# Patient Record
Sex: FEMALE | Race: WHITE | NOT HISPANIC OR LATINO | Employment: FULL TIME | URBAN - NONMETROPOLITAN AREA
[De-identification: names, ages, dates, MRNs, and addresses within clinical notes are randomized per-mention and may not be internally consistent; named-entity substitution may affect disease eponyms.]

---

## 2017-06-27 ENCOUNTER — APPOINTMENT (OUTPATIENT)
Dept: OCCUPATIONAL MEDICINE | Facility: OTHER | Age: 32
End: 2017-06-27

## 2017-06-27 PROCEDURE — 86580 TB INTRADERMAL TEST: CPT

## 2018-06-05 ENCOUNTER — HOSPITAL ENCOUNTER (EMERGENCY)
Facility: HOSPITAL | Age: 33
Discharge: HOME | End: 2018-06-05
Attending: PHYSICIAN ASSISTANT | Admitting: PHYSICIAN ASSISTANT
Payer: COMMERCIAL

## 2018-06-05 ENCOUNTER — APPOINTMENT (OUTPATIENT)
Dept: GENERAL RADIOLOGY | Facility: HOSPITAL | Age: 33
End: 2018-06-05
Attending: PHYSICIAN ASSISTANT
Payer: COMMERCIAL

## 2018-06-05 VITALS
SYSTOLIC BLOOD PRESSURE: 121 MMHG | OXYGEN SATURATION: 97 % | TEMPERATURE: 97.8 F | DIASTOLIC BLOOD PRESSURE: 62 MMHG | HEART RATE: 86 BPM | RESPIRATION RATE: 16 BRPM

## 2018-06-05 DIAGNOSIS — R19.7 DIARRHEA, UNSPECIFIED TYPE: ICD-10-CM

## 2018-06-05 DIAGNOSIS — R50.81 FEVER IN OTHER DISEASES: ICD-10-CM

## 2018-06-05 LAB
ALBUMIN SERPL-MCNC: 3.5 G/DL (ref 3.4–5)
ALBUMIN UR-MCNC: 30 MG/DL
ALP SERPL-CCNC: 55 U/L (ref 40–150)
ALT SERPL W P-5'-P-CCNC: 12 U/L (ref 0–50)
AMYLASE SERPL-CCNC: 47 U/L (ref 30–110)
ANION GAP SERPL CALCULATED.3IONS-SCNC: 8 MMOL/L (ref 3–14)
APPEARANCE UR: CLEAR
AST SERPL W P-5'-P-CCNC: 13 U/L (ref 0–45)
BACTERIA #/AREA URNS HPF: ABNORMAL /HPF
BASOPHILS # BLD AUTO: 0 10E9/L (ref 0–0.2)
BASOPHILS NFR BLD AUTO: 0.5 %
BILIRUB SERPL-MCNC: 0.3 MG/DL (ref 0.2–1.3)
BILIRUB UR QL STRIP: ABNORMAL
BUN SERPL-MCNC: 13 MG/DL (ref 7–30)
C DIFF TOX B STL QL: NEGATIVE
CALCIUM SERPL-MCNC: 8.9 MG/DL (ref 8.5–10.1)
CHLORIDE SERPL-SCNC: 104 MMOL/L (ref 94–109)
CO2 SERPL-SCNC: 25 MMOL/L (ref 20–32)
COLOR UR AUTO: YELLOW
CREAT SERPL-MCNC: 0.78 MG/DL (ref 0.52–1.04)
CRP SERPL-MCNC: 48.2 MG/L (ref 0–8)
DIFFERENTIAL METHOD BLD: NORMAL
EOSINOPHIL # BLD AUTO: 0 10E9/L (ref 0–0.7)
EOSINOPHIL NFR BLD AUTO: 0.5 %
ERYTHROCYTE [DISTWIDTH] IN BLOOD BY AUTOMATED COUNT: 12.3 % (ref 10–15)
G LAMBLIA AG STL QL IA: NORMAL
G LAMBLIA AG STL QL IA: NORMAL
G LAMBLIA+CRYPTOSP AG STL QL IA: NORMAL
GFR SERPL CREATININE-BSD FRML MDRD: 85 ML/MIN/1.7M2
GLUCOSE SERPL-MCNC: 85 MG/DL (ref 70–99)
GLUCOSE UR STRIP-MCNC: NEGATIVE MG/DL
HCG UR QL: NEGATIVE
HCT VFR BLD AUTO: 39.9 % (ref 35–47)
HGB BLD-MCNC: 14.1 G/DL (ref 11.7–15.7)
HGB UR QL STRIP: ABNORMAL
IMM GRANULOCYTES # BLD: 0 10E9/L (ref 0–0.4)
IMM GRANULOCYTES NFR BLD: 0.2 %
KETONES UR STRIP-MCNC: >150 MG/DL
LACTOFERRIN STL QL IA: POSITIVE
LEUKOCYTE ESTERASE UR QL STRIP: NEGATIVE
LIPASE SERPL-CCNC: 169 U/L (ref 73–393)
LYMPHOCYTES # BLD AUTO: 2 10E9/L (ref 0.8–5.3)
LYMPHOCYTES NFR BLD AUTO: 24.5 %
MCH RBC QN AUTO: 29.6 PG (ref 26.5–33)
MCHC RBC AUTO-ENTMCNC: 35.3 G/DL (ref 31.5–36.5)
MCV RBC AUTO: 84 FL (ref 78–100)
MONOCYTES # BLD AUTO: 0.7 10E9/L (ref 0–1.3)
MONOCYTES NFR BLD AUTO: 8.7 %
MUCOUS THREADS #/AREA URNS LPF: PRESENT /LPF
NEUTROPHILS # BLD AUTO: 5.3 10E9/L (ref 1.6–8.3)
NEUTROPHILS NFR BLD AUTO: 65.6 %
NITRATE UR QL: NEGATIVE
NRBC # BLD AUTO: 0 10*3/UL
NRBC BLD AUTO-RTO: 0 /100
PH UR STRIP: 6.5 PH (ref 4.7–8)
PLATELET # BLD AUTO: 352 10E9/L (ref 150–450)
POTASSIUM SERPL-SCNC: 3.9 MMOL/L (ref 3.4–5.3)
PROT SERPL-MCNC: 7.5 G/DL (ref 6.8–8.8)
RBC # BLD AUTO: 4.77 10E12/L (ref 3.8–5.2)
RBC #/AREA URNS AUTO: 8 /HPF (ref 0–2)
SODIUM SERPL-SCNC: 137 MMOL/L (ref 133–144)
SOURCE: ABNORMAL
SP GR UR STRIP: 1.02 (ref 1–1.03)
SPECIMEN SOURCE: NORMAL
SQUAMOUS #/AREA URNS AUTO: 2 /HPF (ref 0–1)
UROBILINOGEN UR STRIP-MCNC: 2 MG/DL (ref 0–2)
WBC # BLD AUTO: 8.1 10E9/L (ref 4–11)
WBC #/AREA URNS AUTO: 5 /HPF (ref 0–5)

## 2018-06-05 PROCEDURE — 83690 ASSAY OF LIPASE: CPT | Performed by: PHYSICIAN ASSISTANT

## 2018-06-05 PROCEDURE — 87046 STOOL CULTR AEROBIC BACT EA: CPT | Performed by: PHYSICIAN ASSISTANT

## 2018-06-05 PROCEDURE — 87015 SPECIMEN INFECT AGNT CONCNTJ: CPT | Performed by: PHYSICIAN ASSISTANT

## 2018-06-05 PROCEDURE — 81025 URINE PREGNANCY TEST: CPT | Performed by: PHYSICIAN ASSISTANT

## 2018-06-05 PROCEDURE — 80053 COMPREHEN METABOLIC PANEL: CPT | Performed by: PHYSICIAN ASSISTANT

## 2018-06-05 PROCEDURE — 36415 COLL VENOUS BLD VENIPUNCTURE: CPT | Performed by: PHYSICIAN ASSISTANT

## 2018-06-05 PROCEDURE — 87899 AGENT NOS ASSAY W/OPTIC: CPT | Performed by: PHYSICIAN ASSISTANT

## 2018-06-05 PROCEDURE — 99214 OFFICE O/P EST MOD 30 MIN: CPT | Performed by: PHYSICIAN ASSISTANT

## 2018-06-05 PROCEDURE — 86140 C-REACTIVE PROTEIN: CPT | Performed by: PHYSICIAN ASSISTANT

## 2018-06-05 PROCEDURE — 83630 LACTOFERRIN FECAL (QUAL): CPT | Performed by: PHYSICIAN ASSISTANT

## 2018-06-05 PROCEDURE — 85025 COMPLETE CBC W/AUTO DIFF WBC: CPT | Performed by: PHYSICIAN ASSISTANT

## 2018-06-05 PROCEDURE — 87045 FECES CULTURE AEROBIC BACT: CPT | Performed by: PHYSICIAN ASSISTANT

## 2018-06-05 PROCEDURE — 81001 URINALYSIS AUTO W/SCOPE: CPT | Performed by: PHYSICIAN ASSISTANT

## 2018-06-05 PROCEDURE — 87329 GIARDIA AG IA: CPT | Performed by: PHYSICIAN ASSISTANT

## 2018-06-05 PROCEDURE — 87493 C DIFF AMPLIFIED PROBE: CPT | Performed by: PHYSICIAN ASSISTANT

## 2018-06-05 PROCEDURE — 87328 CRYPTOSPORIDIUM AG IA: CPT | Performed by: PHYSICIAN ASSISTANT

## 2018-06-05 PROCEDURE — 82150 ASSAY OF AMYLASE: CPT | Performed by: PHYSICIAN ASSISTANT

## 2018-06-05 PROCEDURE — 74019 RADEX ABDOMEN 2 VIEWS: CPT | Mod: TC

## 2018-06-05 PROCEDURE — G0463 HOSPITAL OUTPT CLINIC VISIT: HCPCS

## 2018-06-05 ASSESSMENT — ENCOUNTER SYMPTOMS
CARDIOVASCULAR NEGATIVE: 1
BACK PAIN: 0
VOMITING: 0
FATIGUE: 1
SHORTNESS OF BREATH: 0
CHEST TIGHTNESS: 0
ABDOMINAL PAIN: 0
APPETITE CHANGE: 1
FEVER: 1
NAUSEA: 0
PSYCHIATRIC NEGATIVE: 1
DIARRHEA: 1
NEUROLOGICAL NEGATIVE: 1

## 2018-06-05 NOTE — ED AVS SNAPSHOT
[facility]    [address] [address]    Phone:  [phone]                                       Najma Quispe   MRN: [id number]    Department:  [facility]   Date of Visit:  6/5/2018           Patient Information     Date Of Birth          1985        Your diagnoses for this visit were:     Diarrhea, unspecified type     Fever in other diseases        You were seen by Josee Bansal PA.      Follow-up Information     Follow up with [facility].    Specialty:  EMERGENCY MEDICINE    Why:  If symptoms worsen or if fever/further concerns develop    Contact information:    [address] [place] [address] [phone]    Additional information:    From [place]: Take [address]. Turn left at [address]. Turn left at the first stoplight on [address]. At the first stop sign, take a right onto [address]. Take a left into the parking lot and continue through until you reach the North enterance of the building.       From [place]: Take [address]. Take the [address] ramp towards [place]. Turn left onto [address]. Take a slight right onto [address]. Turn left at the first stoplight on [address]. At the first stop sign, take a right onto [address]. Take a left into the parking lot and continue through until you reach the North enterance of the building.       From [place]: Take [address]. Take a right at [facility] [address]. At the first stop sign, take a right onto [address]. Take a left into the parking lot and continue through until you reach the North enterance of the building.       Discharge References/Attachments     DIARRHEA, TREATING (ENGLISH)    DIARRHEA, UNKNOWN CAUSE (ENGLISH)    GIARDIA ANTIGEN (STOOL) (ENGLISH)    C. DIFF, WHAT IS  (ENGLISH)      Your next 10 appointments already scheduled     Jun 20, 2018  8:30 AM CDT   (Arrive by 8:15 AM)    Office Visit with Maria Guadalupe Ramirez MD   [facility] ([facility] )    [place] [address]   [phone]           Bring a current list of meds and any records pertaining to this visit. For Physicals, please bring immunization records and any forms needing to be filled out. Please arrive 10 minutes early to complete paperwork.                 Review of your medicines      Notice     You have not been prescribed any medications.            Procedures and tests performed during your visit     Procedure/Test Number of Times Performed    Amylase 1    CBC with platelets differential 1    CRP inflammation 1    Clostridium difficile toxin B PCR 1    Comprehensive metabolic panel 1    Fecal Lactoferrin 1    Giardia antigen 1    HCG qualitative urine 1    Lipase 1    Ova and Parasite Screen 2    Stool culture SSCE 1    UA reflex to Microscopic and Culture 1    XR Abdomen 2 Views 1      Orders Needing Specimen Collection     Ordered          06/05/18 1009  Fecal Lactoferrin - STAT, Prio: STAT, Final result     Scheduled Task Status   06/05/18 1009 10-20 Media CC Reminder: Open   Order Class:  PCU Collect                06/05/18 1009  Giardia antigen - STAT, Prio: STAT, Final result     Scheduled Task Status   06/05/18 1009 10-20 Media CC Reminder: Open   Order Class:  PCU Collect                06/05/18 1046  Ova and Parasite Screen - STAT, Prio: STAT, In process     Scheduled Task Status   06/05/18 1047 10-20 Media CC Reminder: Open   Order Class:  PCU Collect                06/05/18 1110  Ova and Parasite Screen - STAT, Prio: STAT, Final result     Scheduled Task Status   06/05/18 1111 10-20 Media CC Reminder: Open   Order Class:  PCU Collect                  Pending Results     Date and Time Order Name Status Description    6/5/2018 1046 Ova and Parasite Screen In process     6/5/2018 1009 Clostridium difficile toxin B PCR In process     6/5/2018 1009 Stool culture SSCE In process   "           Pending Culture Results     Date and Time Order Name Status Description    2018 1046 Ova and Parasite Screen In process     2018 1009 Clostridium difficile toxin B PCR In process     2018 1009 Stool culture SSCE In process             Thank you for choosing [facility]       Thank you for choosing [facility] for your care. Our goal is always to provide you with excellent care. Hearing back from our patients is one way we can continue to improve our services. Please take a few minutes to complete the written survey that you may receive in the mail after you visit with us. Thank you!        [employer] Information     [employer] lets you send messages to your doctor, view your test results, renew your prescriptions, schedule appointments and more. To sign up, go to [email or web address] . Click on \"Log in\" on the left side of the screen, which will take you to the Welcome page. Then click on \"Sign up Now\" on the right side of the page.     You will be asked to enter the access code listed below, as well as some personal information. Please follow the directions to create your username and password.     Your access code is: [email or web address]  Expires: 9/3/2018 11:55 AM     Your access code will  in 90 days. If you need help or a new code, please call your [facility] clinic or [phone].        Care EveryWhere ID     This is your Care EveryWhere ID. This could be used by other organizations to access your [facility] medical records  [id number]        Equal Access to Services     RYAN PISANO AH: Hadii mindy Lomas, waaxda lurichyadaha, qaybta kaalmada david, richelle nugent. So [facility] [phone].    ATENCIÓN: Si habla español, tiene a hurtado disposición servicios gratuitos de asistencia lingüística. Ethel al [phone].    We comply with applicable federal civil rights laws and [place] laws. We do not discriminate on the basis of race, color, national origin, age, " disability, sex, sexual orientation, or gender identity.            After Visit Summary       This is your record. Keep this with you and show to your community pharmacist(s) and doctor(s) at your next visit.

## 2018-06-05 NOTE — ED AVS SNAPSHOT
[facility]    [address] [address]    Phone:  [phone]                                       Najma Quispe   MRN: [id number]    Department:  [facility]   Date of Visit:  6/5/2018           After Visit Summary Signature Page     I have received my discharge instructions, and my questions have been answered. I have discussed any challenges I see with this plan with the nurse or doctor.    ..........................................................................................................................................  Patient/Patient Representative Signature      ..........................................................................................................................................  Patient Representative Print Name and Relationship to Patient    ..................................................               ................................................  Date                                            Time    ..........................................................................................................................................  Reviewed by Signature/Title    ...................................................              ..............................................  Date                                                            Time

## 2018-06-05 NOTE — ED TRIAGE NOTES
Pt presents today with c/o pt was sick all weekend and having diarrhea. Pt is feeling better but is still having some diarrhea and abdominal cramping.

## 2018-06-05 NOTE — ED AVS SNAPSHOT
[facility]    [address] [address]    Phone:  [phone]                                       Najma Quispe   MRN: [id number]    Department[facility]  [facility]   Date of Visit:  6/5/2018           Patient Information     Date Of Birth          1985        Your diagnoses for this visit were:     Diarrhea, unspecified type     Fever in other diseases        You were seen by Josee Bansal PA.      Follow-up Information     Follow up with [facility].    Specialty:  EMERGENCY MEDICINE    Why:  If symptoms worsen or if fever/further concerns develop    Contact information:    [address] [place] [address] [phone]    Additional information:    From [place] Area: Take [address]. Turn left at [address]. Turn left at the first stoplight on [address]. At the first stop sign, take a right onto [address]. Take a left into the parking lot and continue through until you reach the North enterance of the building.       From [place]: Take [address]. Take the [address] ramp towards [place]. Turn left onto [address]. Take a slight right onto [address]. Turn left at the first stoplight on [address]. At the first stop sign, take a right onto [address]. Take a left into the parking lot and continue through until you reach the North enterance of the building.       From [place]: Take [address]. Take a right at [address]. At the first stop sign, take a right onto [address]. Take a left into the parking lot and continue through until you reach the North enterance of the building.       Discharge References/Attachments     DIARRHEA, TREATING (ENGLISH)    DIARRHEA, UNKNOWN CAUSE (ENGLISH)    GIARDIA ANTIGEN (STOOL) (ENGLISH)    C. DIFF, WHAT IS  (ENGLISH)      Your next 10 appointments already scheduled     Jun 20, 2018  8:30 AM CDT   (Arrive by 8:15 AM)    Office Visit with Maria Guadalupe Ramirez MD   [facility] ([facility] )    [place] [address]   [phone]           Bring a current list of meds and any records pertaining to this visit. For Physicals, please bring immunization records and any forms needing to be filled out. Please arrive 10 minutes early to complete paperwork.              [facility] Scheduling Hotline     To schedule an appointment at [facility], please call [phone]. If you don't have a family doctor or clinic, we will help you find one. [facility] are conveniently located to serve the needs of you and your family.           Review of your medicines      Notice     You have not been prescribed any medications.            Procedures and tests performed during your visit     Procedure/Test Number of Times Performed    Amylase 1    CBC with platelets differential 1    CRP inflammation 1    Clostridium difficile toxin B PCR 1    Comprehensive metabolic panel 1    Fecal Lactoferrin 1    Giardia antigen 1    HCG qualitative urine 1    Lipase 1    Ova and Parasite Screen 2    Stool culture SSCE 1    UA reflex to Microscopic and Culture 1    XR Abdomen 2 Views 1      Orders Needing Specimen Collection     Ordered          06/05/18 1009  Fecal Lactoferrin - STAT, Prio: STAT, Final result     Scheduled Task Status   06/05/18 1009 [employer] CC Reminder: Open   Order Class:  PCU Collect                06/05/18 1009  Giardia antigen - STAT, Prio: STAT, Final result     Scheduled Task Status   06/05/18 1009 [employer] CC Reminder: Open   Order Class:  PCU Collect                06/05/18 1046  Ova and Parasite Screen - STAT, Prio: STAT, In process     Scheduled Task Status   06/05/18 1047 [employer] CC Reminder: Open   Order Class:  PCU Collect                06/05/18 1110  Ova and Parasite Screen - STAT, Prio: STAT, Final result     Scheduled Task Status   06/05/18 1111 [employer] CC Reminder: Open  "  Order Class:  PCU Collect                  Pending Results     Date and Time Order Name Status Description    2018 1046 Ova and Parasite Screen In process     2018 1009 Clostridium difficile toxin B PCR In process     2018 1009 Stool culture SSCE In process             Pending Culture Results     Date and Time Order Name Status Description    2018 1046 Ova and Parasite Screen In process     2018 1009 Clostridium difficile toxin B PCR In process     2018 1009 Stool culture SSCE In process             Thank you for choosing [facility]       Thank you for choosing [facility] for your care. Our goal is always to provide you with excellent care. Hearing back from our patients is one way we can continue to improve our services. Please take a few minutes to complete the written survey that you may receive in the mail after you visit with us. Thank you!        [employer] Information     [employer] lets you send messages to your doctor, view your test results, renew your prescriptions, schedule appointments and more. To sign up, go to [email or web address] . Click on \"Log in\" on the left side of the screen, which will take you to the Welcome page. Then click on \"Sign up Now\" on the right side of the page.     You will be asked to enter the access code listed below, as well as some personal information. Please follow the directions to create your username and password.     Your access code is: [email or web address]  Expires: 9/3/2018 11:55 AM     Your access code will  in 90 days. If you need help or a new code, please call your [facility] clinic or [phone].        Care EveryWhere ID     This is your Care EveryWhere ID. This could be used by other organizations to access your [facility] medical records  [id number]        Equal Access to Services     RYAN PISANO AH: Diamond Lomas, miryam mars, richelle cruz. So wacarmelina " [phone].    ATENCIÓN: Si habla español, tiene a hurtado disposición servicios gratuitos de asistencia lingüística. Llame al [phone].    We comply with applicable federal civil rights laws and [place] laws. We do not discriminate on the basis of race, color, national origin, age, disability, sex, sexual orientation, or gender identity.            After Visit Summary       This is your record. Keep this with you and show to your community pharmacist(s) and doctor(s) at your next visit.

## 2018-06-05 NOTE — ED PROVIDER NOTES
History     Chief Complaint   Patient presents with     Diarrhea     states she was sick the weekend with diarhea this weekend and fever. states shes better but still having green diarrhea     The history is provided by the patient. No  was used.     Najma Quispe is a 33 year old female who has  3 days of diarrhea and fever. Has decreased energy/appetite/ no n/v. No abdominal pain/cramping. Has not left [place]. No one else in family has same s/s. 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       Family History:    Family History   Problem Relation Age of Onset     GASTROINTESTINAL DISEASE Mother      celiac     Musculoskeletal Disorder Mother      fibromyalgia     GASTROINTESTINAL DISEASE Father      hepatitis c     CANCER Father      lung, smoker     Respiratory Brother      [place]     Family History Negative Brother      Family History Negative Brother      Psychotic Disorder Sister      anorexia     Neurologic Disorder Paternal Grandmother      ?CVA       Social History:  Marital Status:   [2]  Social History   Substance Use Topics     Smoking status: Never Smoker     Smokeless tobacco: Never Used     Alcohol use Yes      Comment: 1 weekend/mo        Medications:      No current outpatient prescriptions on file.      Review of Systems   Constitutional: Positive for appetite change, fatigue and fever.   HENT: Negative.    Respiratory: Negative for chest tightness and shortness of breath.    Cardiovascular: Negative.    Gastrointestinal: Positive for diarrhea. Negative for abdominal pain, nausea and vomiting.   Musculoskeletal: Negative for back pain.   Skin: Negative for  rash.   Neurological: Negative.    Psychiatric/Behavioral: Negative.        Physical Exam   BP: 121/62  Pulse: 86  Temp: 97.8  F (36.6  C)  Resp: 16  SpO2: 97 %      Physical Exam   Constitutional: She is oriented to person, place, and time. She appears well-developed and well-nourished. No distress.   Neck: Normal range of motion. Neck supple.   Cardiovascular: Normal rate, regular rhythm and normal heart sounds.    Pulmonary/Chest: Effort normal and breath sounds normal. No respiratory distress.   Abdominal: Soft. Bowel sounds are normal. She exhibits no distension. There is no tenderness. There is no guarding.   Neurological: She is alert and oriented to person, place, and time.   Skin: Skin is warm and dry. No rash noted. She is not diaphoretic.   Psychiatric: She has a normal mood and affect. Her behavior is normal.   Nursing note and vitals reviewed.      ED Course     ED Course     Procedures          Results for orders placed or performed during the hospital encounter of 06/05/18 (from the past 24 hour(s))   CBC with platelets differential   Result Value Ref Range    WBC 8.1 4.0 - 11.0 10e9/L    RBC Count 4.77 3.8 - 5.2 10e12/L    Hemoglobin 14.1 11.7 - 15.7 g/dL    Hematocrit 39.9 35.0 - 47.0 %    MCV 84 78 - 100 fl    MCH 29.6 26.5 - 33.0 pg    MCHC 35.3 31.5 - 36.5 g/dL    RDW 12.3 10.0 - 15.0 %    Platelet Count 352 150 - 450 10e9/L    Diff Method Automated Method     % Neutrophils 65.6 %    % Lymphocytes 24.5 %    % Monocytes 8.7 %    % Eosinophils 0.5 %    % Basophils 0.5 %    % Immature Granulocytes 0.2 %    Nucleated RBCs 0 0 /100    Absolute Neutrophil 5.3 1.6 - 8.3 10e9/L    Absolute Lymphocytes 2.0 0.8 - 5.3 10e9/L    Absolute Monocytes 0.7 0.0 - 1.3 10e9/L    Absolute Eosinophils 0.0 0.0 - 0.7 10e9/L    Absolute Basophils 0.0 0.0 - 0.2 10e9/L    Abs Immature Granulocytes 0.0 0 - 0.4 10e9/L    Absolute Nucleated RBC 0.0    Comprehensive metabolic panel   Result Value Ref Range    Sodium 137 133 -  144 mmol/L    Potassium 3.9 3.4 - 5.3 mmol/L    Chloride 104 94 - 109 mmol/L    Carbon Dioxide 25 20 - 32 mmol/L    Anion Gap 8 3 - 14 mmol/L    Glucose 85 70 - 99 mg/dL    Urea Nitrogen 13 7 - 30 mg/dL    Creatinine 0.78 0.52 - 1.04 mg/dL    GFR Estimate 85 >60 mL/min/1.7m2    GFR Estimate If Black >90 >60 mL/min/1.7m2    Calcium 8.9 8.5 - 10.1 mg/dL    Bilirubin Total 0.3 0.2 - 1.3 mg/dL    Albumin 3.5 3.4 - 5.0 g/dL    Protein Total 7.5 6.8 - 8.8 g/dL    Alkaline Phosphatase 55 40 - 150 U/L    ALT 12 0 - 50 U/L    AST 13 0 - 45 U/L   Amylase   Result Value Ref Range    Amylase 47 30 - 110 U/L   Lipase   Result Value Ref Range    Lipase 169 73 - 393 U/L   CRP inflammation   Result Value Ref Range    CRP Inflammation 48.2 (H) 0.0 - 8.0 mg/L   UA reflex to Microscopic and Culture   Result Value Ref Range    Color Urine Yellow     Appearance Urine Clear     Glucose Urine Negative NEG^Negative mg/dL    Bilirubin Urine Small (A) NEG^Negative    Ketones Urine >150 (A) NEG^Negative mg/dL    Specific Gravity Urine 1.022 1.003 - 1.035    Blood Urine Small (A) NEG^Negative    pH Urine 6.5 4.7 - 8.0 pH    Protein Albumin Urine 30 (A) NEG^Negative mg/dL    Urobilinogen mg/dL 2.0 0.0 - 2.0 mg/dL    Nitrite Urine Negative NEG^Negative    Leukocyte Esterase Urine Negative NEG^Negative    Source Midstream Urine     RBC Urine 8 (H) 0 - 2 /HPF    WBC Urine 5 0 - 5 /HPF    Bacteria Urine Few (A) NEG^Negative /HPF    Squamous Epithelial /HPF Urine 2 (H) 0 - 1 /HPF    Mucous Urine Present (A) NEG^Negative /LPF   HCG qualitative urine   Result Value Ref Range    HCG Qual Urine Negative NEG^Negative   Clostridium difficile toxin B PCR   Result Value Ref Range    Specimen Description Feces     C Diff Toxin B PCR Negative NEG^Negative   Fecal Lactoferrin   Result Value Ref Range    Fecal Lactoferrin Positive (A) NEG^Negative   Giardia antigen   Result Value Ref Range    Specimen Description Feces     Giardia Antigen Test Canceled, Test  credited     Giardia Antigen Test Incorrectly ordered by PCU/Clinic    Ova and Parasite Screen   Result Value Ref Range    Specimen Description Feces     Ova and Parasite Canceled, Test credited  Duplicate request      XR Abdomen 2 Views    Narrative    PROCEDURE: XR ABDOMEN 2 VW 6/5/2018 11:15 AM    HISTORY: diarrhea;     COMPARISONS: None.    TECHNIQUE: Flat and upright    FINDINGS: There Is a normal intestinal gas pattern. No extraluminal  gas or pathologic intra-abdominal calcifications are noted.         Impression    IMPRESSION: Normal abdominal gas pattern    TACOS SANCHEZ MD         Assessments & Plan (with Medical Decision Making)     I have reviewed the nursing notes.    I have reviewed the findings, diagnosis, plan and need for follow up with the patient.        Final diagnoses:   Diarrhea, unspecified type   Fever in other diseases         Patient verbally educated and given appropriate education sheets for the diagnoses and has no questions.   if further concerns develop, return to the ER  Josee Bansal Certified  Physician Assistant  6/5/2018  9:18 PM  URGENT CARE CLINIC      6/5/2018   [facility]     Josee Bansal PA  06/05/18 9279

## 2018-06-06 LAB
G LAMBLIA+CRYPTOSP AG STL QL IA: NORMAL
G LAMBLIA+CRYPTOSP AG STL QL IA: NORMAL
SPECIMEN SOURCE: NORMAL

## 2018-06-08 LAB
BACTERIA SPEC CULT: NORMAL
BACTERIA SPEC CULT: NORMAL
E COLI SXT1+2 STL IA: NORMAL
SPECIMEN SOURCE: NORMAL

## 2018-07-06 ENCOUNTER — HOSPITAL ENCOUNTER (EMERGENCY)
Facility: HOSPITAL | Age: 33
Discharge: HOME | End: 2018-07-06
Attending: PHYSICIAN ASSISTANT | Admitting: PHYSICIAN ASSISTANT
Payer: COMMERCIAL

## 2018-07-06 VITALS — RESPIRATION RATE: 16 BRPM | TEMPERATURE: 100.1 F | OXYGEN SATURATION: 97 %

## 2018-07-06 DIAGNOSIS — J02.0 ACUTE STREPTOCOCCAL PHARYNGITIS: ICD-10-CM

## 2018-07-06 LAB
DEPRECATED S PYO AG THROAT QL EIA: ABNORMAL
SPECIMEN SOURCE: ABNORMAL

## 2018-07-06 PROCEDURE — G0463 HOSPITAL OUTPT CLINIC VISIT: HCPCS

## 2018-07-06 PROCEDURE — 99213 OFFICE O/P EST LOW 20 MIN: CPT | Performed by: PHYSICIAN ASSISTANT

## 2018-07-06 PROCEDURE — 87880 STREP A ASSAY W/OPTIC: CPT | Performed by: FAMILY MEDICINE

## 2018-07-06 RX ORDER — CEFDINIR 300 MG/1
300 CAPSULE ORAL 2 TIMES DAILY
Qty: 20 CAPSULE | Refills: 0 | Status: SHIPPED | OUTPATIENT
Start: 2018-07-06 | End: 2020-05-14

## 2018-07-06 ASSESSMENT — ENCOUNTER SYMPTOMS
TROUBLE SWALLOWING: 1
NECK PAIN: 0
CARDIOVASCULAR NEGATIVE: 1
SINUS PRESSURE: 0
VOICE CHANGE: 0
SORE THROAT: 1
FATIGUE: 1
VOMITING: 0
PSYCHIATRIC NEGATIVE: 1
EYE DISCHARGE: 0
DIARRHEA: 0
FEVER: 1
EYE REDNESS: 0
NECK STIFFNESS: 0
HEADACHES: 0
NAUSEA: 0
LIGHT-HEADEDNESS: 0
ABDOMINAL PAIN: 0
DIZZINESS: 0
APPETITE CHANGE: 1
COUGH: 0

## 2018-07-06 NOTE — ED AVS SNAPSHOT
[facility]    [address] [address]    Phone:  [phone]                                       Najma Quispe   MRN: [id number]    Department[facility]  [facility]   Date of Visit:  7/6/2018           After Visit Summary Signature Page     I have received my discharge instructions, and my questions have been answered. I have discussed any challenges I see with this plan with the nurse or doctor.    ..........................................................................................................................................  Patient/Patient Representative Signature      ..........................................................................................................................................  Patient Representative Print Name and Relationship to Patient    ..................................................               ................................................  Date                                            Time    ..........................................................................................................................................  Reviewed by Signature/Title    ...................................................              ..............................................  Date                                                            Time

## 2018-07-06 NOTE — ED AVS SNAPSHOT
[facility]    [address] [address]    Phone:  [phone]                                       Najma Quispe   MRN: [id number]    Department:  [facility]   Date of Visit:  7/6/2018           Patient Information     Date Of Birth          1985        Your diagnoses for this visit were:     Acute streptococcal pharyngitis        You were seen by Josee Bansal PA.      Follow-up Information     Follow up with [facility].    Specialty:  EMERGENCY MEDICINE    Why:  If symptoms worsen or if further concerns develop over the weekend    Contact information:    [address] [address] [phone]    Additional information:    From [facility]: Take [address]. Turn left at [address]. Turn left at the first stoplight on [address] [place] [address]. At the first stop sign, take a right onto [address]. Take a left into the parking lot and continue through until you reach the North enterance of the building.       From [place]: Take [address]. Take the [address] ramp towards [place]. Turn left onto [address]. Take a slight right onto [address]. Turn left at the first stoplight on [address]. At the first stop sign, take a right onto [address]. Take a left into the parking lot and continue through until you reach the North enterance of the building.       From [place]: Take [address]. Take a right at [address]. At the first stop sign, take a right onto [address]. Take a left into the parking lot and continue through until you reach the North enterance of the building.       Discharge References/Attachments     PHARYNGITIS, STREP (CONFIRMED) (ENGLISH)    SORE THROATS, SELF-CARE FOR (ENGLISH)         Review of your medicines      START taking        Dose / Directions Last dose taken    cefdinir 300 MG capsule   Commonly known as:  OMNICEF   Dose:  300 mg  "  Quantity:  20 capsule        Take 1 capsule (300 mg) by mouth 2 times daily   Refills:  0                Prescriptions were sent or printed at these locations (1 Prescription)                   [id number] [place] - [address] AT [facility] [address]   [address] [address]    Telephone:  [phone]   Fax:  [phone]   Hours:                  E-Prescribed (1 of 1)         cefdinir (OMNICEF) 300 MG capsule                Procedures and tests performed during your visit     Rapid strep screen      Orders Needing Specimen Collection     None      Pending Results     No orders found from 2018 to 2018.            Pending Culture Results     No orders found from 2018 to 2018.            Thank you for choosing [facility]       Thank you for choosing [facility] for your care. Our goal is always to provide you with excellent care. Hearing back from our patients is one way we can continue to improve our services. Please take a few minutes to complete the written survey that you may receive in the mail after you visit with us. Thank you!        [employer] Information     [employer] lets you send messages to your doctor, view your test results, renew your prescriptions, schedule appointments and more. To sign up, go to [email or web address] . Click on \"Log in\" on the left side of the screen, which will take you to the Welcome page. Then click on \"Sign up Now\" on the right side of the page.     You will be asked to enter the access code listed below, as well as some personal information. Please follow the directions to create your username and password.     Your access code is: [email or web address]  Expires: 9/3/2018 11:55 AM     Your access code will  in 90 days. If you need help or a new code, please call your [facility] clinic or [phone].        Care EveryWhere ID     This is your Care EveryWhere ID. This could be used by other organizations to access your [facility] " medical records  [id number]        Equal Access to Services     RYAN PISANO : Diamond Lomas, miryam mars, richelle cruz. So [facility] [phone].    ATENCIÓN: Si habla español, tiene a hurtado disposición servicios gratuitos de asistencia lingüística. Llame al [phone].    We comply with applicable federal civil rights laws and [place] laws. We do not discriminate on the basis of race, color, national origin, age, disability, sex, sexual orientation, or gender identity.            After Visit Summary       This is your record. Keep this with you and show to your community pharmacist(s) and doctor(s) at your next visit.

## 2018-07-07 NOTE — ED PROVIDER NOTES
History     Chief Complaint   Patient presents with     Pharyngitis     The history is provided by the patient. No  was used.     Najma Quispe is a 33 year old female who has one day of sore throat, low fever and decreased energy. Has painful swallowing. No n/v/d. No rash. No ear/sinus pain/pressure. 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       Family History:    Family History   Problem Relation Age of Onset     GASTROINTESTINAL DISEASE Mother      celiac     Musculoskeletal Disorder Mother      fibromyalgia     GASTROINTESTINAL DISEASE Father      hepatitis c     Cancer Father      lung, smoker     Respiratory Brother      BRITTON     Family History Negative Brother      Family History Negative Brother      Psychotic Disorder Sister      anorexia     Neurologic Disorder Paternal Grandmother      ?CVA       Social History:  Marital Status:   [2]  Social History   Substance Use Topics     Smoking status: Never Smoker     Smokeless tobacco: Never Used     Alcohol use Yes      Comment: 1 weekend/mo        Medications:      cefdinir (OMNICEF) 300 MG capsule         Review of Systems   Constitutional: Positive for appetite change, fatigue and fever.   HENT: Positive for sore throat and trouble swallowing. Negative for congestion, ear pain, sinus pressure and voice change.    Eyes: Negative for discharge and redness.   Respiratory: Negative for cough.    Cardiovascular: Negative.    Gastrointestinal: Negative for abdominal pain, diarrhea, nausea and vomiting.   Genitourinary: Negative.    Musculoskeletal: Negative for neck pain and neck stiffness.   Skin:  Negative for rash.   Neurological: Negative for dizziness, light-headedness and headaches.   Psychiatric/Behavioral: Negative.        Physical Exam   Heart Rate: 88  Temp: 100.1  F (37.8  C)  Resp: 16  SpO2: 97 %      Physical Exam   Constitutional: She is oriented to person, place, and time. She appears well-developed and well-nourished. No distress.   HENT:   Head: Normocephalic and atraumatic.   Right Ear: External ear normal.   Left Ear: External ear normal.   Mouth/Throat: Oropharyngeal exudate (+1 edema/erythema) present.   Bilateral TMs/canals clear/wnl  No sinus TTP     Eyes: Conjunctivae and EOM are normal. Right eye exhibits no discharge. Left eye exhibits no discharge.   Neck: Normal range of motion. Neck supple.   Cardiovascular: Normal rate, regular rhythm and normal heart sounds.    Pulmonary/Chest: Effort normal and breath sounds normal. No respiratory distress.   Abdominal: Soft. Bowel sounds are normal. She exhibits no distension. There is no tenderness.   Neurological: She is alert and oriented to person, place, and time.   Skin: Skin is warm and dry. No rash noted. She is not diaphoretic.   Psychiatric: She has a normal mood and affect.   Nursing note and vitals reviewed.      ED Course     ED Course     Procedures              Results for orders placed or performed during the hospital encounter of 07/06/18 (from the past 24 hour(s))   Rapid strep screen   Result Value Ref Range    Specimen Description Throat     Rapid Strep A Screen (A)      POSITIVE: Group A Streptococcal antigen detected by immunoassay.           Assessments & Plan (with Medical Decision Making)     I have reviewed the nursing notes.    I have reviewed the findings, diagnosis, plan and need for follow up with the patient.      Discharge Medication List as of 7/6/2018 10:49 AM      START taking these medications    Details   cefdinir (OMNICEF) 300 MG capsule Take 1 capsule (300 mg) by mouth 2 times daily, Disp-20 capsule, R-0,  E-Prescribe             Final diagnoses:   Acute streptococcal pharyngitis         Patient verbally educated and given appropriate education sheets for each of the diagnoses and has no questions.  Take OTC motrin or tylenol as directed on the bottle as needed.  Take prescription medications as directed.  Increase fluids, wash hands often.  Sleep in a recliner or with multiple pillows until this has resolved.  Follow up with your provider if symptoms increase or if further concerns develop, return to the ER.  Josee Bansal Certified   Physician Assistant  7/6/2018  8:15 PM  URGENT CARE CLINIC    7/6/2018   [facility]     Josee Bansal PA  07/06/18 2017

## 2020-05-12 ENCOUNTER — RESULTS ONLY (OUTPATIENT)
Dept: LAB | Age: 35
End: 2020-05-12

## 2020-05-14 ENCOUNTER — HOSPITAL ENCOUNTER (EMERGENCY)
Facility: HOSPITAL | Age: 35
Discharge: HOME | End: 2020-05-14
Attending: NURSE PRACTITIONER | Admitting: NURSE PRACTITIONER
Payer: COMMERCIAL

## 2020-05-14 VITALS
DIASTOLIC BLOOD PRESSURE: 72 MMHG | TEMPERATURE: 98.6 F | OXYGEN SATURATION: 98 % | SYSTOLIC BLOOD PRESSURE: 103 MMHG | RESPIRATION RATE: 18 BRPM

## 2020-05-14 DIAGNOSIS — J02.0 STREP THROAT: ICD-10-CM

## 2020-05-14 DIAGNOSIS — J06.9 VIRAL URI: ICD-10-CM

## 2020-05-14 LAB
SARS-COV-2 RNA SPEC QL NAA+PROBE: NOT DETECTED
SPECIMEN SOURCE: ABNORMAL
SPECIMEN SOURCE: NORMAL
STREP GROUP A PCR: ABNORMAL

## 2020-05-14 PROCEDURE — G0463 HOSPITAL OUTPT CLINIC VISIT: HCPCS

## 2020-05-14 PROCEDURE — 87651 STREP A DNA AMP PROBE: CPT | Performed by: NURSE PRACTITIONER

## 2020-05-14 PROCEDURE — 99213 OFFICE O/P EST LOW 20 MIN: CPT | Mod: Z6 | Performed by: NURSE PRACTITIONER

## 2020-05-14 RX ORDER — AZITHROMYCIN 250 MG/1
TABLET, FILM COATED ORAL
Qty: 6 TABLET | Refills: 0 | Status: SHIPPED | OUTPATIENT
Start: 2020-05-14 | End: 2020-05-19

## 2020-05-14 ASSESSMENT — ENCOUNTER SYMPTOMS
RESPIRATORY NEGATIVE: 1
FATIGUE: 1
NEUROLOGICAL NEGATIVE: 1
CARDIOVASCULAR NEGATIVE: 1
ENDOCRINE NEGATIVE: 1
ALLERGIC/IMMUNOLOGIC NEGATIVE: 1
PSYCHIATRIC NEGATIVE: 1
FEVER: 1
HEMATOLOGIC/LYMPHATIC NEGATIVE: 1
EYES NEGATIVE: 1
SORE THROAT: 1
GASTROINTESTINAL NEGATIVE: 1
MUSCULOSKELETAL NEGATIVE: 1

## 2020-05-14 NOTE — ED NOTES
Patient discharged with understanding that [facility] will call with results today as well as treatment plan.   Denies any questions or concerns.     Lucille Grajeda LPN on 5/14/2020 at 11:08 AM

## 2020-05-14 NOTE — DISCHARGE INSTRUCTIONS
Strep test pending - will call with results once they are available    Stay hydrated  Rest  Cool mist humidifier at night  Gargle with warm salt water or antibacterial mouth wash  Tylenol and or ibuprofen for comfort    Follow up if no improvement or worsening symptoms

## 2020-05-14 NOTE — ED AVS SNAPSHOT
[facility]  [address] [address]  Phone:  [phone]                                    Najma Quispe   MRN: [id number]    Department:  [facility]   Date of Visit:  5/14/2020           After Visit Summary Signature Page    I have received my discharge instructions, and my questions have been answered. I have discussed any challenges I see with this plan with the nurse or doctor.    ..........................................................................................................................................  Patient/Patient Representative Signature      ..........................................................................................................................................  Patient Representative Print Name and Relationship to Patient    ..................................................               ................................................  Date                                   Time    ..........................................................................................................................................  Reviewed by Signature/Title    ...................................................              ..............................................  Date                                               Time          22EPIC Rev 08/18

## 2020-05-14 NOTE — ED PROVIDER NOTES
History     Chief Complaint   Patient presents with     Pharyngitis     The history is provided by the patient.     Najma Quispe is a 35 year old female who presents to the  for sore throat and ear pressure for about 5 days..  She was tested for COVID-19 on Tuesday and was negative.  She has tried ibuprofen and theraflu with minimal relief.  Symptoms worsen during the night.      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       Family History:    Family History   Problem Relation Age of Onset     Gastrointestinal Disease Mother         celiac     Musculoskeletal Disorder Mother         fibromyalgia     Gastrointestinal Disease Father         hepatitis c     Cancer Father         lung, smoker     Respiratory Brother         BRITTON     Family History Negative Brother      Family History Negative Brother      Psychotic Disorder Sister         anorexia     Neurologic Disorder Paternal Grandmother         ?CVA       Social History:  Marital Status:   [2]  Social History     Tobacco Use     Smoking status: Never Smoker     Smokeless tobacco: Never Used   Substance Use Topics     Alcohol use: Yes     Comment: 1 weekend/mo     Drug use: No        Medications:    azithromycin (ZITHROMAX Z-HEMAL) 250 MG tablet          Review of Systems   Constitutional: Positive for fatigue and fever.   HENT: Positive for ear pain and sore throat.    Eyes: Negative.    Respiratory: Negative.    Cardiovascular: Negative.    Gastrointestinal: Negative.    Endocrine: Negative.    Genitourinary: Negative.    Musculoskeletal: Negative.    Skin: Negative.    Allergic/Immunologic: Negative.     Neurological: Negative.    Hematological: Negative.    Psychiatric/Behavioral: Negative.        Physical Exam   BP: 103/72  Heart Rate: 80  Temp: 98.6  F (37  C)  Resp: 18  SpO2: 98 %      Physical Exam  Vitals signs and nursing note reviewed.   Constitutional:       Appearance: She is ill-appearing.   HENT:      Right Ear: Tympanic membrane and ear canal normal.      Left Ear: Tympanic membrane and ear canal normal.      Mouth/Throat:      Mouth: Mucous membranes are moist.      Pharynx: Posterior oropharyngeal erythema present.      Tonsils: 2+ on the right. 2+ on the left.   Cardiovascular:      Rate and Rhythm: Normal rate.      Heart sounds: Normal heart sounds.   Pulmonary:      Effort: Pulmonary effort is normal. No respiratory distress.      Breath sounds: Normal breath sounds.   Abdominal:      General: Bowel sounds are normal.      Palpations: Abdomen is soft.      Tenderness: There is no abdominal tenderness.   Skin:     General: Skin is warm and dry.   Neurological:      Mental Status: She is alert and oriented to person, place, and time.   Psychiatric:         Mood and Affect: Mood normal.         Behavior: Behavior normal.         ED Course        Procedures              Critical Care time:  none               Results for orders placed or performed during the hospital encounter of 05/14/20 (from the past 24 hour(s))   Group A Streptococcus PCR Throat Swab    Specimen: Throat   Result Value Ref Range    Specimen Description Throat     Strep Group A PCR Detected, Abnormal Result (A) NDET^Not Detected       Medications - No data to display    Assessments & Plan (with Medical Decision Making)     I have reviewed the nursing notes.    I have reviewed the findings, diagnosis, plan and need for follow up with the patient.      Patient verbally educated and given appropriate education sheets for each of the diagnoses and has no questions.  Take OTC motrin or tylenol as directed on the bottle as needed.  Take  prescription medications as directed.  Increase fluids, wash hands often.  Sleep in a recliner or with multiple pillows until this has resolved.  Follow up with your provider if symptoms increase or if further concerns develop, return to the ER.    Strep test pending - will call with results once they are available    Stay hydrated  Rest  Cool mist humidifier at night  Gargle with warm salt water or antibacterial mouth wash  Tylenol and or ibuprofen for comfort    Follow up if no improvement or worsening symptoms           Called Najma with results of her positive strep test.  Prescription for azithromycin sent to pharmacy     There are no discharge medications for this patient.      Final diagnoses:   Viral URI   Strep throat       5/14/2020   [facility]     Evy Ramirez APRN CNP  05/14/20 1156       Evy Ramirez APRN CNP  05/25/20 [id number]

## 2020-05-14 NOTE — ED TRIAGE NOTES
Patient presents today with c/o sore throat.   Ongoing for 5 days.   Experiencing fevers (highest 99.7), bilateral ear pressure,   Denies cough, SOB, chest pain, nausea, vomiting, diarrhea, congestion, rhinorrhea, facial pain / pressure, headaches.   Tested for COVID on Tuesday - negative results   Taking theraflu and ibuprofen - some relief.   Decreased intake in food and fluids.   Normal bowel / bladder.

## 2021-08-17 ENCOUNTER — OFFICE VISIT (OUTPATIENT)
Dept: CHIROPRACTIC MEDICINE | Facility: OTHER | Age: 36
End: 2021-08-17
Attending: CHIROPRACTOR
Payer: COMMERCIAL

## 2021-08-17 DIAGNOSIS — M99.02 SEGMENTAL AND SOMATIC DYSFUNCTION OF THORACIC REGION: ICD-10-CM

## 2021-08-17 DIAGNOSIS — M99.03 SEGMENTAL AND SOMATIC DYSFUNCTION OF LUMBAR REGION: ICD-10-CM

## 2021-08-17 DIAGNOSIS — M99.01 SEGMENTAL AND SOMATIC DYSFUNCTION OF CERVICAL REGION: Primary | ICD-10-CM

## 2021-08-17 DIAGNOSIS — M54.2 CERVICALGIA: ICD-10-CM

## 2021-08-17 PROCEDURE — 99202 OFFICE O/P NEW SF 15 MIN: CPT | Mod: 25 | Performed by: CHIROPRACTOR

## 2021-08-17 PROCEDURE — 98941 CHIROPRACT MANJ 3-4 REGIONS: CPT | Mod: AT | Performed by: CHIROPRACTOR

## 2021-08-17 NOTE — PROGRESS NOTES
Subjective Finding:    Chief compalint: Patient presents with:  Back Pain  Neck Pain: right scap pain  , Pain Scale: 4/10, Intensity: sharp, Duration: 2 weeks, Radiating:  no.    Date of injury:     Activities that the pain restricts:   Home/household/hobbies/social activities: yes.  Work duties: no.  Sleep: no.  Makes symptoms better: rest.  Makes symptoms worse: activity.  Have you seen anyone else for the symptoms? No.  Work related: no.  Automobile related injury: no.    Objective and Assessment:    Posture Analysis:   High shoulder: .  Head tilt: .  High iliac crest: .  Head carriage: forward.  Thoracic Kyphosis: neutral.  Lumbar Lordosis: neutral.    Lumbar Range of Motion: left lateral flexion decreased.  Cervical Range of Motion: extension decreased.  Thoracic Range of Motion: extension decreased.  Extremity Range of Motion: .    Palpation:   Quad lumb: bilateral, referred pain: no  Sub-occiput: sharp pain, referred pain: no    Segmental dysfunction pre-treatment and treatment area: C3, C4, T1, T4 and L5.    Assessment post-treatment:  Cervical: ROM increased.  Thoracic: ROM increased.  Lumbar: ROM increased.    Comments: .      Complicating Factors: .    Procedure(s):  CMT:  42413 Chiropractic manipulative treatment 3-4 regions performed   Cervical: Diversified, See above for level, Supine, Thoracic: Diversified, See above for level, Prone and Lumbar: Diversified, See above for level, Side posture    Modalities:  None performed this visit    Therapeutic procedures:  None    Plan:  Treatment plan: 2 times per week for 1 weeks.  Instructed patient: stretch as instructed at visit.  Short term goals: reduce pain.  Long term goals: increase ADL.  Prognosis: excellent.

## 2021-10-14 ENCOUNTER — LAB (OUTPATIENT)
Dept: OCCUPATIONAL MEDICINE | Facility: OTHER | Age: 36
End: 2021-10-14

## 2022-10-19 ENCOUNTER — OFFICE VISIT (OUTPATIENT)
Dept: FAMILY MEDICINE | Facility: OTHER | Age: 37
End: 2022-10-19
Attending: NURSE PRACTITIONER
Payer: COMMERCIAL

## 2022-10-19 VITALS
DIASTOLIC BLOOD PRESSURE: 62 MMHG | WEIGHT: 165 LBS | OXYGEN SATURATION: 99 % | TEMPERATURE: 98.7 F | RESPIRATION RATE: 18 BRPM | SYSTOLIC BLOOD PRESSURE: 94 MMHG | BODY MASS INDEX: 27.04 KG/M2 | HEART RATE: 67 BPM

## 2022-10-19 DIAGNOSIS — N89.8 VAGINAL CYST: ICD-10-CM

## 2022-10-19 DIAGNOSIS — N92.0 MENORRHAGIA WITH REGULAR CYCLE: Primary | ICD-10-CM

## 2022-10-19 DIAGNOSIS — Z76.89 ENCOUNTER TO ESTABLISH CARE: ICD-10-CM

## 2022-10-19 PROCEDURE — 99213 OFFICE O/P EST LOW 20 MIN: CPT | Performed by: NURSE PRACTITIONER

## 2022-10-19 NOTE — NURSING NOTE
"Chief Complaint   Patient presents with     Establish Care       Initial BP 94/62   Pulse 67   Temp 98.7  F (37.1  C) (Tympanic)   Resp 18   Wt 74.8 kg (165 lb)   SpO2 99%   BMI 27.04 kg/m   Estimated body mass index is 27.04 kg/m  as calculated from the following:    Height as of 1/5/15: 1.664 m (5' 5.5\").    Weight as of this encounter: 74.8 kg (165 lb).  Medication Reconciliation: complete  Yenny Garcia LPN  "

## 2022-10-19 NOTE — PROGRESS NOTES
Assessment & Plan     (N92.0) Menorrhagia with regular cycle  (primary encounter diagnosis)  Comment: ref to Dr. Chávez at [facility]   Plan: Ob/Gyn Referral            (Z76.89) Encounter to establish care  Comment: reviewed past medical, surgical, and family history. Medications reviewed. Care established   Plan: as above       (N89.8) Vaginal cyst  Comment: firm cyst to distal end of vaginal opening   Plan: continue to monitor        See Patient Instructions    Return in about 3 months (around 1/19/2023), or Fasting physical.     If Dr. Chávez does her pap so can do lab work only and follow up in 1 year with me. If she doesn't have her PAP she will follow up with me.     DEEPIKA Reyes [facility]    Wiliam Yao is a 37 year old, presenting for the following health issues:    Establish Care    Najma is .     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       HPI     Concern - Cyst  Onset: Isa uthral  Description:   Intensity: mild  Progression of Symptoms:  constant  Accompanying Signs & Symptoms: NA  Previous history of similar problem: NA  Precipitating factors:        Worsened by: NA  Alleviating factors:        Improved by: NA  Therapies tried and outcome:  none     Her periods are becoming heavier, clots, and more painful. She would like to see gyn. She desires no more children. Her  had a vasectomy.       Review of Systems   Constitutional, HEENT, cardiovascular, pulmonary, gi and gu systems are negative, except as otherwise noted.      Objective    BP 94/62   Pulse 67   Temp 98.7  F (37.1  C) (Tympanic)   Resp 18   Wt 74.8 kg (165 lb)   SpO2 99%   BMI 27.04 kg/m    Body mass index is 27.04 kg/m .  Physical Exam   GENERAL: healthy, alert and no distress  NECK: no adenopathy, no asymmetry, masses, or scars and thyroid normal to palpation  RESP: lungs clear to auscultation - no rales, rhonchi or  wheezes  CV: regular rate and rhythm, normal S1 S2, no S3 or S4, no murmur, click or rub, no peripheral edema and peripheral pulses strong   (female): normal female external genitalia, normal urethral meatus, vaginal mucosa. +distal end of vaginal opening with a firm cyst palpated. No swelling or fluctuance on exam to cyst area   SKIN: no suspicious lesions or rashes  PSYCH: mentation appears normal, affect normal/bright

## 2022-10-23 RX ORDER — WHEAT DEXTRIN/ASPARTAME 3 G/6 G
1 POWDER IN PACKET (EA) ORAL DAILY
COMMUNITY
Start: 2022-10-23 | End: 2024-08-15

## 2022-11-03 ENCOUNTER — APPOINTMENT (OUTPATIENT)
Dept: OCCUPATIONAL MEDICINE | Facility: OTHER | Age: 37
End: 2022-11-03

## 2022-12-05 ENCOUNTER — TELEPHONE (OUTPATIENT)
Dept: FAMILY MEDICINE | Facility: OTHER | Age: 37
End: 2022-12-05

## 2022-12-05 NOTE — TELEPHONE ENCOUNTER
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 No  If yes : Appointment type              Date    Preferred method for responding to this message: Telephone Call  What is your phone number ? [phone] cell or office [phone]    If we cannot reach you directly, may we leave a detailed response at the number you provided? Yes    Can this message wait until your PCP/provider returns, if available today? Monica Sandoval

## 2022-12-06 NOTE — TELEPHONE ENCOUNTER
Called patient - Clarification -     Patient Dentist calling to inquire as to weather or not patient needs antibiotic treatments prior to cleanings, procedures or fillings.     Patient stating that she had an open patent ductus fixed at 11 years old

## 2022-12-09 NOTE — TELEPHONE ENCOUNTER
Per Up To Date guidelines, prophylactic antibiotic use for dental procedures is not indicated. Please advise.

## 2023-01-05 ENCOUNTER — LAB (OUTPATIENT)
Dept: OCCUPATIONAL MEDICINE | Facility: OTHER | Age: 38
End: 2023-01-05

## 2023-01-22 ENCOUNTER — HEALTH MAINTENANCE LETTER (OUTPATIENT)
Age: 38
End: 2023-01-22

## 2023-01-24 ENCOUNTER — OFFICE VISIT (OUTPATIENT)
Dept: OBGYN | Facility: OTHER | Age: 38
End: 2023-01-24
Attending: NURSE PRACTITIONER
Payer: COMMERCIAL

## 2023-01-24 VITALS
SYSTOLIC BLOOD PRESSURE: 102 MMHG | WEIGHT: 168.8 LBS | HEART RATE: 68 BPM | BODY MASS INDEX: 27.66 KG/M2 | DIASTOLIC BLOOD PRESSURE: 68 MMHG

## 2023-01-24 DIAGNOSIS — Z30.430 ENCOUNTER FOR INSERTION OF MIRENA IUD: ICD-10-CM

## 2023-01-24 DIAGNOSIS — N84.1 CERVICAL POLYP: Primary | ICD-10-CM

## 2023-01-24 DIAGNOSIS — Z12.4 CERVICAL CANCER SCREENING: ICD-10-CM

## 2023-01-24 DIAGNOSIS — N93.9 ABNORMAL UTERINE BLEEDING: ICD-10-CM

## 2023-01-24 PROCEDURE — 87624 HPV HI-RISK TYP POOLED RSLT: CPT | Mod: ZL | Performed by: STUDENT IN AN ORGANIZED HEALTH CARE EDUCATION/TRAINING PROGRAM

## 2023-01-24 PROCEDURE — G0123 SCREEN CERV/VAG THIN LAYER: HCPCS | Performed by: STUDENT IN AN ORGANIZED HEALTH CARE EDUCATION/TRAINING PROGRAM

## 2023-01-24 PROCEDURE — 250N000011 HC RX IP 250 OP 636: Performed by: STUDENT IN AN ORGANIZED HEALTH CARE EDUCATION/TRAINING PROGRAM

## 2023-01-24 PROCEDURE — 88305 TISSUE EXAM BY PATHOLOGIST: CPT

## 2023-01-24 PROCEDURE — 58300 INSERT INTRAUTERINE DEVICE: CPT | Performed by: STUDENT IN AN ORGANIZED HEALTH CARE EDUCATION/TRAINING PROGRAM

## 2023-01-24 PROCEDURE — 99204 OFFICE O/P NEW MOD 45 MIN: CPT | Mod: 25 | Performed by: STUDENT IN AN ORGANIZED HEALTH CARE EDUCATION/TRAINING PROGRAM

## 2023-01-24 RX ADMIN — LEVONORGESTREL 20 MCG: 52 INTRAUTERINE DEVICE INTRAUTERINE at 11:07

## 2023-01-24 NOTE — PROGRESS NOTES
Gynecology Office Visit    Chief Complaint: heavy menstrual bleeding    HPI:    Najma Quispe is a 37 year old , here for discussion of heavy menses.    The flow has continued to increase over the last few years. She uses the Diva cup and it seems to be overflowing after 4-6 hours, 12 hours is the goal for the cup. She has some intermittent cramping, but has noticed more of the concern is that the amount of bleeding is significant.     In the past she has been on a three month birth control pack and this helped re-regulate things at that time. This was approximately 14-15 years ago.  It did seem to significantly help at that time.    In the past she has been told that she has endometriosis however has not had definitive surgical diagnosis at this    OBHx  OB History    Para Term  AB Living   3 3 3 0 0 3   SAB IAB Ectopic Multiple Live Births   0 0 0 0 0      # Outcome Date GA Lbr Ervin/2nd Weight Sex Delivery Anes PTL Lv   3 Term      Vag-Spont      2 Term      Vag-Spont      1 Term      Vag-Spont         Obstetric Comments   Breast fed     Pelvis proven to 7lb 13 oz    GYN history:   No history of STIs   Last pap smear: unknown, approximately 14 years, No history of abnormal pap smears   Menses occur q 33 days and usually last 5 days      Past medical history:  Past Medical History:   Diagnosis Date     Endometriosis      Specifically denies VTE, DM, HTN or bleeding disorders    Past Surgical History:  Past Surgical History:   Procedure Laterality Date     heart valve surgery  1997    'extra heart valve' removed     DE COSMETIC EXPLANATION OF BILATERAL BREAST IMPLANTS OPNP Bilateral 2011    Alergan implants       Medications:  Current Outpatient Medications   Medication     bulk laxative (BENEFIBER DRINK MIX) packet     No current facility-administered medications for this visit.       Allergies:     No Known Allergies      Social History:  Social History     Tobacco Use     Smoking  status: Never     Smokeless tobacco: Never   Vaping Use     Vaping Use: Never used   Substance Use Topics     Alcohol use: Yes     Comment: 1 weekend/mo     Drug use: No     Lives at home with her  and two sons  Lives in [place]  Works as a nurse at [employer]    Family History:  Family History   Problem Relation Age of Onset     Obesity Mother      Gastrointestinal Disease Mother         celiac     Musculoskeletal Disorder Mother         fibromyalgia     Alcoholism Mother      Depression Father      Alcoholism Father      Gastrointestinal Disease Father         hepatitis c     Cancer Father         lung, smoker     Liver Cancer Father      Depression Sister      Psychotic Disorder Sister         anorexia     Attention Deficit Disorder Sister      Obesity Brother      Respiratory Brother         BRITTON     Alcoholism Brother      Family History Negative Brother      Family History Negative Brother      Ovarian Cancer Maternal Grandmother      Uterine Cancer Maternal Grandmother      Neurologic Disorder Paternal Grandmother         ?CVA     Dementia Paternal Grandmother    Maternal grandmother: Ovarian and uterine cancer    Specifically denies breast colon, pancreatic cancers  Specifically denies VTE, known familial thrombophilias and coagulopathies    ROS:   Respiratory: No shortness of breath, dyspnea on exertion, cough, or hemoptysis  Cardiovascular: negative for palpitations, chest pain, lower extremity edema and syncope or near-syncope  Gastrointestinal: negative for, nausea, vomiting and hematemesis  Genitourinary: negative for, dysuria, frequency and urgency  Musculoskeletal: negative for, back pain and muscular weakness  Psychiatric: negative for, anxiety, depression and hallucinations  Hematologic/Lymphatic/Immunologic: negative for, anemia, chills and fever      Physical Exam  /68   Pulse 68   Wt 76.6 kg (168 lb 12.8 oz)   LMP 01/01/2023   BMI 27.66 kg/m    Gen: Well-appearing, no acute  distressed, well-groomed, alert  HEENT: Normocephalic, atraumatic  Cardiovascular: Regular rate, No peripheral edema, normal peripheral circulation  Pulm: Symmetric chest rise, non-labored respirations  Abd: Soft, non-tender, non-distended  Ext: No LE edema, extremities warm and well perfused  Pelvic:  Normal appearing external female genitalia. Normal hair distribution. Vagina is without lesions with moist, pink ruggae. There is no vaginal discharge. Cervix parous, small, friable, approximately 2 to 3 mm endocervical polyp noted.  This polyp was gently grasped and rotated off its stalk.  This was sent for pathology review. no cervical motion tenderness. Uterus is approximately 10cm, mobile, non-tender. No adnexal tenderness or masses    Mirena Insertion Note:  After the risks and benefits of the procedure were discussed with the patient, informed consent was obtained.  Risks were discussed including, but not limited to, bleeding, cramping, infection, uterine perforation and expulsion.  A bimanual exam was performed to reveal an anteverted uterus. The speculum was gently introduced to visualize the cervix. The cervix was cleaned with betadine swabs and a single-tooth tenaculum was placed at the 12 o'clock position. The uterine sound was used to provide measurement.  The Mirena IUD insertion device was set up according to the utering sound measurement, loaded and placed through the cervical canal until gently able to reach the uterine fundus.  The application device was pulled back approximately 1.5 cm to allow for appropriate IUD arm release.  The IUD was then again gently pushed to the uterine fundus and the applicator withdrawn. The IUD strings were trimmed to 2 to 3 cm from the external cervical os.  After removal of all instruments a small amount of continued oozing was noted from the tenaculum sites.  Hemostasis was achieved with the use of silver nitrate sticks.  No further bleeding was noted after application  of pressure at the sites.  She tolerated the procedure well.     Mirena Lot #: [id number]  Exp: 2025      Assessment/Plan  Najma Quispe is a 37 year old  female here for heavy menstrual bleeding    # Heavy menstrual bleeding  -- Mirena placed today without difficulty   -- Pelvic ultrasound ordered  As she is low risk for endometrial hyperplasia and under the recommended atrium for biopsy we opted to not pursue endometrial biopsy at this time.  Discussed that if bleeding does not improve with the Mirena IUD this would be next steps in work-up.    #Cervical cancer screening  --Pap smear with HPV cotesting collected today    #Cervical polyp  --Gently removed with polyp forceps before exam today      We discussed that heavy menstrual bleeding can be caused by a variety of etiologies including uterine leiomyomas, adenomyosis, perimenopausal changes in cycle length and duration of bleeding, uterine polyp, endometrial hyperplasia, pre-cancerous changes or cancerous changes to the uterus.     Treatment options for heavy menstrual bleeding can range from initial conservative measures including using over the counter NSAIDS with each period to reduce bleeding and discomfort, hormonal control of each cycle with combined estrogen-progestin oral contraceptives, a long-standing hormonal control with a levonorgestrel IUD, to even more invasive techniques including an endometrial ablation or definitive management with a hysterectomy. We talked about the risks and benefits of each, and suggested trying one of the less invasive techniques first to see if this improves symptoms. She is a candidate for estrogen-containing options as she does not have HTN (BP today 102/68), she has no personal history of VTE, CAD, DM SLE  or migraine with auraand she does not smoke.     We talked about how after endometrial ablations, while they are often very successful in creating amenorrhea, one thing to consider is that it can be  difficult to detect any potential cancerous changes in the endometrium in the future as the scarring sometimes does not allow postmenopausal bleeding to escape.  It is also not recommended at her early age.  With her specific risk factor being low, it is just something to consider in her ultimate choice on next steps in treatment     She has opted for Mirena IUD placement as noted above,.      Total amount of time spent during today's encounter including chart prep, face to face, documentation was 50 minutes. 5 minutes in addition were spent with the procedure.    ADRIA QUESADA MD on 1/24/2023 at 10:15 AM

## 2023-01-26 LAB
PATH REPORT.COMMENTS IMP SPEC: NORMAL
PATH REPORT.FINAL DX SPEC: NORMAL
PHOTO IMAGE: NORMAL

## 2023-01-31 LAB
BKR LAB AP GYN ADEQUACY: NORMAL
BKR LAB AP GYN INTERPRETATION: NORMAL
BKR LAB AP HPV REFLEX: NORMAL
BKR LAB AP PREVIOUS ABNORMAL: NORMAL
PATH REPORT.COMMENTS IMP SPEC: NORMAL
PATH REPORT.COMMENTS IMP SPEC: NORMAL
PATH REPORT.RELEVANT HX SPEC: NORMAL

## 2023-02-07 LAB
HUMAN PAPILLOMA VIRUS 16 DNA: NEGATIVE
HUMAN PAPILLOMA VIRUS 18 DNA: NEGATIVE
HUMAN PAPILLOMA VIRUS FINAL DIAGNOSIS: NORMAL
HUMAN PAPILLOMA VIRUS OTHER HR: NEGATIVE

## 2023-02-14 ENCOUNTER — HOSPITAL ENCOUNTER (OUTPATIENT)
Dept: ULTRASOUND IMAGING | Facility: OTHER | Age: 38
Discharge: HOME | End: 2023-02-14
Attending: STUDENT IN AN ORGANIZED HEALTH CARE EDUCATION/TRAINING PROGRAM | Admitting: STUDENT IN AN ORGANIZED HEALTH CARE EDUCATION/TRAINING PROGRAM
Payer: COMMERCIAL

## 2023-02-14 DIAGNOSIS — N93.9 ABNORMAL UTERINE BLEEDING: ICD-10-CM

## 2023-02-14 PROCEDURE — 76856 US EXAM PELVIC COMPLETE: CPT

## 2023-03-20 ENCOUNTER — OFFICE VISIT (OUTPATIENT)
Dept: OBGYN | Facility: OTHER | Age: 38
End: 2023-03-20
Attending: STUDENT IN AN ORGANIZED HEALTH CARE EDUCATION/TRAINING PROGRAM
Payer: COMMERCIAL

## 2023-03-20 VITALS
SYSTOLIC BLOOD PRESSURE: 102 MMHG | BODY MASS INDEX: 27.94 KG/M2 | DIASTOLIC BLOOD PRESSURE: 64 MMHG | HEART RATE: 72 BPM | WEIGHT: 170.5 LBS

## 2023-03-20 DIAGNOSIS — Z01.812 PRE-PROCEDURE LAB EXAM: ICD-10-CM

## 2023-03-20 DIAGNOSIS — N93.9 VAGINAL BLEEDING: Primary | ICD-10-CM

## 2023-03-20 PROCEDURE — 99212 OFFICE O/P EST SF 10 MIN: CPT | Mod: 25 | Performed by: STUDENT IN AN ORGANIZED HEALTH CARE EDUCATION/TRAINING PROGRAM

## 2023-03-20 PROCEDURE — 58100 BIOPSY OF UTERUS LINING: CPT | Performed by: STUDENT IN AN ORGANIZED HEALTH CARE EDUCATION/TRAINING PROGRAM

## 2023-03-20 PROCEDURE — 88305 TISSUE EXAM BY PATHOLOGIST: CPT

## 2023-03-20 NOTE — NURSING NOTE
Pt presents to clinic today for EMB procedure.      Medication Reconciliation: complete  Sandy Osuna LPN

## 2023-03-20 NOTE — PROGRESS NOTES
Follow-Up Visit    S: Ms. Najma Quispe is a 37 year old  here for endometrial biopsy. She presented a few months ago with heavy menstrual bleeding and decided to place a Mirena IUD. The IUD has been working well for her, she does still have the irregular spotting. We performed a pelvic US to assess IUD placement and the read returned as a possible neoplasm at the uterine fundus as it was measuring 0.6 cm in thickness. Because of this we opted to perform and EMB to assess for any pre-malignancy.    O:  /64   Pulse 72   Wt 77.3 kg (170 lb 8 oz)   LMP 2023   Breastfeeding No   BMI 27.94 kg/m    Gen: Well-appearing, NAD  Pulm: nonlabored  Abd: Soft, non-tender, non-distended  Ext: No LE edema, extremities warm and well perfused    Pelvic:  Normal appearing external female genitalia. Normal hair distribution. Vagina is without lesions. No vaginal discharge. Cervix parous, no lesions, no cervical motion tenderness. IUD strings noted at the external os. Uterus is small, mobile, non-tender. No adnexal tenderness or masses.     Endometrial Biopsy Note:   We discussed the risks, benefits and alternatives to the procedure and Ms. Quispe was agreeable to proceed with the EMB. Consents were signed.  A bimanual exam was performed to reveal an anteverted uterus.  She was assisted into a lithotomy position and a speculum was gently inserted to provide visualization of the cervix.  The cervix was clearly visualized.  The cervix was cleaned with 3 Betadine swabs. The cervix was slightly dilated as she had just been bleeding and no tenaculum or os finder was needed. The biopsy cannula was then gently inserted through the already dilated cervix and advanced slowly to the uterine fundus.  The plunger was pulled and with suction applied a sample was collected on removal of the cannula in a twisting manner.  The tissue was placed in a specimen cup.  A second pass was performed in a similar manner to complete  the tissue collection.       I also was asked to trim the IUD strings slightly as her partner could feel them. I trimmed them to the level of the external os and noted that on removal, we may need the string grasper if they slide up into the cervix more.    A/P:  Ms. Najma Quispe is a 37 year old  here for EMB.    - EMB performed without difficulty, will follow up pathology results    Total amount of time spent during today's encounter including chart prep, face to face time and documentation with procedure was 20 minutes. 5 minutes were dedicated to the procedure  Celena Herzog MD on 3/21/2023 at 5:06 PM

## 2023-03-24 LAB
PATH REPORT.COMMENTS IMP SPEC: NORMAL
PATH REPORT.FINAL DX SPEC: NORMAL
PHOTO IMAGE: NORMAL

## 2023-10-09 ENCOUNTER — HOSPITAL ENCOUNTER (OUTPATIENT)
Dept: CT IMAGING | Facility: HOSPITAL | Age: 38
Discharge: HOME | End: 2023-10-09
Attending: NURSE PRACTITIONER | Admitting: NURSE PRACTITIONER
Payer: COMMERCIAL

## 2023-10-09 DIAGNOSIS — R10.84 ABDOMINAL PAIN, GENERALIZED: ICD-10-CM

## 2023-10-09 DIAGNOSIS — R10.84 ABDOMINAL PAIN, GENERALIZED: Primary | ICD-10-CM

## 2023-10-09 PROCEDURE — 74177 CT ABD & PELVIS W/CONTRAST: CPT

## 2023-10-09 PROCEDURE — 250N000011 HC RX IP 250 OP 636: Performed by: RADIOLOGY

## 2023-10-09 RX ORDER — IOPAMIDOL 755 MG/ML
17 INJECTION, SOLUTION INTRAVASCULAR ONCE
Status: COMPLETED | OUTPATIENT
Start: 2023-10-09 | End: 2023-10-09

## 2023-10-09 RX ORDER — IOPAMIDOL 755 MG/ML
83 INJECTION, SOLUTION INTRAVASCULAR ONCE
Status: COMPLETED | OUTPATIENT
Start: 2023-10-09 | End: 2023-10-09

## 2023-10-09 RX ADMIN — IOPAMIDOL 83 ML: 755 INJECTION, SOLUTION INTRAVENOUS at 12:23

## 2023-10-09 RX ADMIN — IOPAMIDOL 17 ML: 755 INJECTION, SOLUTION INTRAVENOUS at 12:23

## 2023-10-09 NOTE — DISCHARGE INSTRUCTIONS
What to expect when you have contrast    During your exam, we will inject  contrast  into your vein or artery. (Contrast is a clear liquid with iodine in it. It shows up on X-rays.)    You may feel warm or hot. You may have a metal taste in your mouth and a slight upset stomach. You may also feel pressure near the kidneys and bladder. These effects will last about 1 to 3 minutes.    Please tell us if you have:   Sneezing    Itching   Hives    Swelling in the face   A hoarse voice   Breathing problems   Other new symptoms    Serious problems are rare.  They may include:   Irregular heartbeat    Seizures   Kidney failure             Tissue damage   Shock     Death    If you have any problems during the exam, we  will treat them right away.    When you get home    Call your hospital if you have any new symptoms in the next 2 days, like hives or swelling. (Phone numbers are at the bottom of this page.) Or call your family doctor.     If you have wheezing or trouble breathing, call 911.    Self-care  -Drink at least 4 extra glasses of water today.   This reduces the stress on your kidneys.  -Keep taking your regular medicines.    The contrast will pass out of your body in your  Urine(pee). This will happen in the next 24 hours. You  will not feel this. Your urine will not  change color.    If you have kidney problems or take metformin    Drink 4 to 8 large glasses of water for the next  2 days, if you are not on a fluid restriction.    ?If you take metformin (Glucophage or Glucovance) for diabetes, keep taking it.      ?Your kidney function tests are abnormal.  If you take Metformin, do not take it for 48 hours. Please go to your clinic for a blood test within 3 days after your exam before the restarting this medicine.     (Note to provider:please give patient prescription for lab tests.)    ?Special instructions: -    I have read and understand the above information.    Patient Sign  Here:______________________________________Date:________Time:______    Staff Sign Here:________________________________________Date:_______Time:______      Radiology Departments:     ?[facility]: [phone] ?[facility]: [phone]     ?[place]: [phone] ?[place]:[phone]      ?[place]: [phone]  ?[place]: [phone]  ?[place]:[phone]    ?[facility] [place]:[phone]  ?[facility] [place]:[phone]

## 2024-02-18 ENCOUNTER — HEALTH MAINTENANCE LETTER (OUTPATIENT)
Age: 39
End: 2024-02-18

## 2024-08-14 ASSESSMENT — ANXIETY QUESTIONNAIRES
5. BEING SO RESTLESS THAT IT IS HARD TO SIT STILL: NOT AT ALL
7. FEELING AFRAID AS IF SOMETHING AWFUL MIGHT HAPPEN: MORE THAN HALF THE DAYS
4. TROUBLE RELAXING: NOT AT ALL
GAD7 TOTAL SCORE: 8
3. WORRYING TOO MUCH ABOUT DIFFERENT THINGS: SEVERAL DAYS
1. FEELING NERVOUS, ANXIOUS, OR ON EDGE: MORE THAN HALF THE DAYS
IF YOU CHECKED OFF ANY PROBLEMS ON THIS QUESTIONNAIRE, HOW DIFFICULT HAVE THESE PROBLEMS MADE IT FOR YOU TO DO YOUR WORK, TAKE CARE OF THINGS AT HOME, OR GET ALONG WITH OTHER PEOPLE: SOMEWHAT DIFFICULT
8. IF YOU CHECKED OFF ANY PROBLEMS, HOW DIFFICULT HAVE THESE MADE IT FOR YOU TO DO YOUR WORK, TAKE CARE OF THINGS AT HOME, OR GET ALONG WITH OTHER PEOPLE?: SOMEWHAT DIFFICULT
7. FEELING AFRAID AS IF SOMETHING AWFUL MIGHT HAPPEN: MORE THAN HALF THE DAYS
GAD7 TOTAL SCORE: 8
2. NOT BEING ABLE TO STOP OR CONTROL WORRYING: SEVERAL DAYS
6. BECOMING EASILY ANNOYED OR IRRITABLE: MORE THAN HALF THE DAYS

## 2024-08-15 ENCOUNTER — OFFICE VISIT (OUTPATIENT)
Dept: FAMILY MEDICINE | Facility: OTHER | Age: 39
End: 2024-08-15
Attending: NURSE PRACTITIONER
Payer: COMMERCIAL

## 2024-08-15 VITALS
HEART RATE: 66 BPM | BODY MASS INDEX: 27.37 KG/M2 | WEIGHT: 167 LBS | DIASTOLIC BLOOD PRESSURE: 64 MMHG | OXYGEN SATURATION: 98 % | SYSTOLIC BLOOD PRESSURE: 100 MMHG | TEMPERATURE: 98.7 F

## 2024-08-15 DIAGNOSIS — Z00.00 HEALTHCARE MAINTENANCE: ICD-10-CM

## 2024-08-15 DIAGNOSIS — R53.83 OTHER FATIGUE: ICD-10-CM

## 2024-08-15 DIAGNOSIS — F41.1 GAD (GENERALIZED ANXIETY DISORDER): Primary | ICD-10-CM

## 2024-08-15 LAB
ALBUMIN SERPL BCG-MCNC: 4.3 G/DL (ref 3.5–5.2)
ALP SERPL-CCNC: 61 U/L (ref 40–150)
ALT SERPL W P-5'-P-CCNC: 13 U/L (ref 0–50)
ANION GAP SERPL CALCULATED.3IONS-SCNC: 13 MMOL/L (ref 7–15)
AST SERPL W P-5'-P-CCNC: 17 U/L (ref 0–45)
BILIRUB SERPL-MCNC: 0.6 MG/DL
BUN SERPL-MCNC: 12.9 MG/DL (ref 6–20)
CALCIUM SERPL-MCNC: 9.5 MG/DL (ref 8.8–10.4)
CHLORIDE SERPL-SCNC: 106 MMOL/L (ref 98–107)
CHOLEST SERPL-MCNC: 166 MG/DL
CREAT SERPL-MCNC: 0.76 MG/DL (ref 0.51–0.95)
EGFRCR SERPLBLD CKD-EPI 2021: >90 ML/MIN/1.73M2
FASTING STATUS PATIENT QL REPORTED: YES
FASTING STATUS PATIENT QL REPORTED: YES
FSH SERPL IRP2-ACNC: 3.9 MIU/ML
GLUCOSE SERPL-MCNC: 107 MG/DL (ref 70–99)
HCO3 SERPL-SCNC: 21 MMOL/L (ref 22–29)
HDLC SERPL-MCNC: 53 MG/DL
LDLC SERPL CALC-MCNC: 103 MG/DL
NONHDLC SERPL-MCNC: 113 MG/DL
POTASSIUM SERPL-SCNC: 4.7 MMOL/L (ref 3.4–5.3)
PROT SERPL-MCNC: 7.2 G/DL (ref 6.4–8.3)
SODIUM SERPL-SCNC: 140 MMOL/L (ref 135–145)
TRIGL SERPL-MCNC: 49 MG/DL
TSH SERPL DL<=0.005 MIU/L-ACNC: 2.5 UIU/ML (ref 0.3–4.2)
VIT D+METAB SERPL-MCNC: 41 NG/ML (ref 20–50)

## 2024-08-15 PROCEDURE — 83001 ASSAY OF GONADOTROPIN (FSH): CPT | Performed by: NURSE PRACTITIONER

## 2024-08-15 PROCEDURE — 80061 LIPID PANEL: CPT | Performed by: NURSE PRACTITIONER

## 2024-08-15 PROCEDURE — 84443 ASSAY THYROID STIM HORMONE: CPT | Performed by: NURSE PRACTITIONER

## 2024-08-15 PROCEDURE — G2211 COMPLEX E/M VISIT ADD ON: HCPCS | Performed by: NURSE PRACTITIONER

## 2024-08-15 PROCEDURE — 99213 OFFICE O/P EST LOW 20 MIN: CPT | Performed by: NURSE PRACTITIONER

## 2024-08-15 PROCEDURE — 36415 COLL VENOUS BLD VENIPUNCTURE: CPT | Performed by: NURSE PRACTITIONER

## 2024-08-15 PROCEDURE — 82306 VITAMIN D 25 HYDROXY: CPT | Performed by: NURSE PRACTITIONER

## 2024-08-15 PROCEDURE — 80053 COMPREHEN METABOLIC PANEL: CPT | Performed by: NURSE PRACTITIONER

## 2024-08-15 RX ORDER — ESCITALOPRAM OXALATE 20 MG/1
20 TABLET ORAL DAILY
Qty: 30 TABLET | Refills: 1 | Status: SHIPPED | OUTPATIENT
Start: 2024-08-15 | End: 2024-10-03

## 2024-08-15 RX ORDER — BUSPIRONE HYDROCHLORIDE 10 MG/1
10 TABLET ORAL 3 TIMES DAILY PRN
Qty: 30 TABLET | Refills: 1 | Status: SHIPPED | OUTPATIENT
Start: 2024-08-15 | End: 2024-10-03

## 2024-08-15 RX ORDER — CHOLECALCIFEROL (VITAMIN D3) 50 MCG
1 TABLET ORAL DAILY
COMMUNITY
Start: 2024-01-01

## 2024-08-15 RX ORDER — MAGNESIUM GLYCINATE 100 MG
100 CAPSULE ORAL DAILY
COMMUNITY
Start: 2024-07-01

## 2024-08-15 ASSESSMENT — PAIN SCALES - GENERAL: PAINLEVEL: NO PAIN (0)

## 2024-08-15 NOTE — PROGRESS NOTES
Assessment & Plan     (F41.1) REMIGIO (generalized anxiety disorder)  (primary encounter diagnosis)  Comment: Check labs. Start Lexapro. Add Buspar as needed   Plan: TSH with free T4 reflex, Follicle stimulating         hormone, Comprehensive metabolic panel,         escitalopram (LEXAPRO) 20 MG tablet, busPIRone         (BUSPAR) 10 MG tablet            (R53.83) Other fatigue  Comment: Check vitamin D level   Plan: Vitamin D Deficiency            (Z00.00) Healthcare maintenance  Comment: Check lipid. Fasting today   Plan: Lipid Profile (Chol, Trig, HDL, LDL calc)          The longitudinal plan of care for the diagnosis(es)/condition(s) as documented were addressed during this visit. Due to the added complexity in care, I will continue to support Najma in the subsequent management and with ongoing continuity of care.    See Patient Instructions    Return in about 6 weeks (around 9/26/2024) for REMIGIO follow up .    Wiliam Yao is a 39 year old, presenting for the following health issues:  Anxiety        8/15/2024     8:52 AM   Additional Questions   Roomed by Cori   Accompanied by self     HPI     Abnormal Mood Symptoms    Duration: since winter   Description:  Depression: YES  Anxiety: YES  Panic attacks: no   Accompanying signs and symptoms: see PHQ-9 and REMIGIO scores, feeling anxious, irritable, fatigue,   History (similar episodes/previous evaluation): none   Precipitating or alleviating factors: worse 1 week before menses   Therapies tried and outcome: exercise, vitamin b and d,          8/14/2024     1:52 PM   REMIGIO-7 SCORE   Total Score 8 (mild anxiety)   Total Score 8     Denies SI/HI thoughts.     Review of Systems  Constitutional, HEENT, cardiovascular, pulmonary, GI, , musculoskeletal, neuro, skin, endocrine and psych systems are negative, except as otherwise noted.      Objective    /64   Pulse 66   Temp 98.7  F (37.1  C) (Tympanic)   Wt 75.8 kg (167 lb)   SpO2 98%   BMI 27.37 kg/m    Body mass  index is 27.37 kg/m .  Physical Exam   GENERAL: alert and no distress  NECK: no adenopathy, no asymmetry, masses, or scars  RESP: lungs clear to auscultation - no rales, rhonchi or wheezes  CV: regular rate and rhythm, normal S1 S2, no S3 or S4, no murmur, click or rub, no peripheral edema  SKIN: no suspicious lesions or rashes  NEURO: Normal strength and tone, mentation intact and speech normal  PSYCH: mentation appears normal, affect normal/bright        Signed Electronically by: DEEPIKA Reyes CNP

## 2024-10-03 ENCOUNTER — MYC REFILL (OUTPATIENT)
Dept: FAMILY MEDICINE | Facility: OTHER | Age: 39
End: 2024-10-03

## 2024-10-03 DIAGNOSIS — F41.1 GAD (GENERALIZED ANXIETY DISORDER): ICD-10-CM

## 2024-10-03 RX ORDER — ESCITALOPRAM OXALATE 20 MG/1
20 TABLET ORAL DAILY
Qty: 90 TABLET | Refills: 1 | Status: SHIPPED | OUTPATIENT
Start: 2024-10-03

## 2024-10-03 RX ORDER — BUSPIRONE HYDROCHLORIDE 10 MG/1
10 TABLET ORAL 3 TIMES DAILY PRN
Qty: 60 TABLET | Refills: 1 | Status: SHIPPED | OUTPATIENT
Start: 2024-10-03

## 2024-10-03 NOTE — TELEPHONE ENCOUNTER
Lexapro       Last Written Prescription Date:  8/15/2024  Last Fill Quantity: 30,   # refills: 1  Last Office Visit: 8/15/2024  Future Office visit:         Joselyn       Last Written Prescription Date:  8/15/2024  Last Fill Quantity: 30,   # refills: 1  Last Office Visit: 8/15/2024  Future Office visit:       Patient comment: I am leaving on a trip to [place] for 10 days and will run out before returning home.

## 2024-11-13 NOTE — PROGRESS NOTES
Assessment & Plan     (F41.1) REMIGIO (generalized anxiety disorder)  (primary encounter diagnosis)  Comment: Improved. Happy with Lexapro and Buspar   Plan: Continue medication regimen     (F34.1) Dysthymic disorder  Comment: Improved. No concerns   Plan: Continue medication regimen     The longitudinal plan of care for the diagnosis(es)/condition(s) as documented were addressed during this visit. Due to the added complexity in care, I will continue to support Najma in the subsequent management and with ongoing continuity of care.    See Patient Instructions    Return in about 3 months (around 2/14/2025) for Fasting physical .    Wiliam Yao is a 39 year old, presenting for the following health issues:  Depression and Anxiety        11/14/2024     1:50 PM   Additional Questions   Roomed by Cori BELTRAN     Depression and Anxiety   How are you doing with your depression since your last visit? Improved   How are you doing with your anxiety since your last visit?  Improved   Are you having other symptoms that might be associated with depression or anxiety? Yes:  see phq/remigio   Have you had a significant life event? No   Do you have any concerns with your use of alcohol or other drugs? No    Social History     Tobacco Use    Smoking status: Never    Smokeless tobacco: Never   Vaping Use    Vaping status: Never Used   Substance Use Topics    Alcohol use: Yes     Comment: 1 weekend/mo    Drug use: No         11/14/2024     1:48 PM   PHQ   PHQ-9 Total Score 2    Q9: Thoughts of better off dead/self-harm past 2 weeks Not at all        Patient-reported         8/14/2024     1:52 PM 11/14/2024     1:49 PM   REMIGIO-7 SCORE   Total Score 8 (mild anxiety) 2 (minimal anxiety)   Total Score 8 2        Patient-reported         11/14/2024     1:48 PM   Last PHQ-9   1.  Little interest or pleasure in doing things 0    2.  Feeling down, depressed, or hopeless 0    3.  Trouble falling or staying asleep, or sleeping too much 1    4.   Feeling tired or having little energy 1    5.  Poor appetite or overeating 0    6.  Feeling bad about yourself 0    7.  Trouble concentrating 0    8.  Moving slowly or restless 0    Q9: Thoughts of better off dead/self-harm past 2 weeks 0    PHQ-9 Total Score 2        Patient-reported         11/14/2024     1:49 PM   REMIGIO-7    1. Feeling nervous, anxious, or on edge 1    2. Not being able to stop or control worrying 0    3. Worrying too much about different things 0    4. Trouble relaxing 1    5. Being so restless that it is hard to sit still 0    6. Becoming easily annoyed or irritable 0    7. Feeling afraid, as if something awful might happen 0    REMIGIO-7 Total Score 2    If you checked any problems, how difficult have they made it for you to do your work, take care of things at home, or get along with other people? Somewhat difficult        Patient-reported     Denies SI/HI thoughts.       Medication Followup of Buspar  Taking Medication as prescribed: yes  Side Effects:  None  Medication Helping Symptoms:  yes    Medication Followup of Lexapro  Taking Medication as prescribed: yes  Side Effects:  None  Medication Helping Symptoms:  yes    Health Care Directive  Patient does not have a Health Care Directive or Living Will: Discussed advance care planning with patient; however, patient declined at this time.        Review of Systems  Constitutional, HEENT, cardiovascular, pulmonary, GI, , musculoskeletal, neuro, skin, endocrine and psych systems are negative, except as otherwise noted.      Objective    /58   Pulse 57   Temp 98.4  F (36.9  C) (Tympanic)   Wt 73.5 kg (162 lb)   SpO2 99%   BMI 26.55 kg/m    Body mass index is 26.55 kg/m .  Physical Exam   GENERAL: alert and no distress  NECK: no adenopathy, no asymmetry, masses, or scars  RESP: lungs clear to auscultation - no rales, rhonchi or wheezes  CV: regular rate and rhythm, normal S1 S2, no S3 or S4, no murmur, click or rub, no peripheral  edema  MS: no gross musculoskeletal defects noted, no edema  SKIN: no suspicious lesions or rashes  NEURO: Normal strength and tone, mentation intact and speech normal  PSYCH: mentation appears normal, affect normal/bright      Signed Electronically by: DEEPIKA Reyes CNP

## 2024-11-14 ENCOUNTER — OFFICE VISIT (OUTPATIENT)
Dept: FAMILY MEDICINE | Facility: OTHER | Age: 39
End: 2024-11-14
Attending: NURSE PRACTITIONER
Payer: COMMERCIAL

## 2024-11-14 VITALS
SYSTOLIC BLOOD PRESSURE: 110 MMHG | HEART RATE: 57 BPM | TEMPERATURE: 98.4 F | DIASTOLIC BLOOD PRESSURE: 58 MMHG | WEIGHT: 162 LBS | BODY MASS INDEX: 26.55 KG/M2 | OXYGEN SATURATION: 99 %

## 2024-11-14 DIAGNOSIS — F41.1 GAD (GENERALIZED ANXIETY DISORDER): Primary | ICD-10-CM

## 2024-11-14 DIAGNOSIS — F34.1 DYSTHYMIC DISORDER: ICD-10-CM

## 2024-11-14 DIAGNOSIS — Z12.31 ENCOUNTER FOR SCREENING MAMMOGRAM FOR BREAST CANCER: ICD-10-CM

## 2024-11-14 PROCEDURE — 99213 OFFICE O/P EST LOW 20 MIN: CPT | Performed by: NURSE PRACTITIONER

## 2024-11-14 PROCEDURE — G2211 COMPLEX E/M VISIT ADD ON: HCPCS | Performed by: NURSE PRACTITIONER

## 2024-11-14 ASSESSMENT — ANXIETY QUESTIONNAIRES
4. TROUBLE RELAXING: SEVERAL DAYS
7. FEELING AFRAID AS IF SOMETHING AWFUL MIGHT HAPPEN: NOT AT ALL
IF YOU CHECKED OFF ANY PROBLEMS ON THIS QUESTIONNAIRE, HOW DIFFICULT HAVE THESE PROBLEMS MADE IT FOR YOU TO DO YOUR WORK, TAKE CARE OF THINGS AT HOME, OR GET ALONG WITH OTHER PEOPLE: SOMEWHAT DIFFICULT
7. FEELING AFRAID AS IF SOMETHING AWFUL MIGHT HAPPEN: NOT AT ALL
1. FEELING NERVOUS, ANXIOUS, OR ON EDGE: SEVERAL DAYS
GAD7 TOTAL SCORE: 2
GAD7 TOTAL SCORE: 2
5. BEING SO RESTLESS THAT IT IS HARD TO SIT STILL: NOT AT ALL
2. NOT BEING ABLE TO STOP OR CONTROL WORRYING: NOT AT ALL
8. IF YOU CHECKED OFF ANY PROBLEMS, HOW DIFFICULT HAVE THESE MADE IT FOR YOU TO DO YOUR WORK, TAKE CARE OF THINGS AT HOME, OR GET ALONG WITH OTHER PEOPLE?: SOMEWHAT DIFFICULT
3. WORRYING TOO MUCH ABOUT DIFFERENT THINGS: NOT AT ALL
6. BECOMING EASILY ANNOYED OR IRRITABLE: NOT AT ALL
GAD7 TOTAL SCORE: 2

## 2024-11-14 ASSESSMENT — PAIN SCALES - GENERAL: PAINLEVEL_OUTOF10: NO PAIN (0)

## 2024-11-14 ASSESSMENT — PATIENT HEALTH QUESTIONNAIRE - PHQ9
10. IF YOU CHECKED OFF ANY PROBLEMS, HOW DIFFICULT HAVE THESE PROBLEMS MADE IT FOR YOU TO DO YOUR WORK, TAKE CARE OF THINGS AT HOME, OR GET ALONG WITH OTHER PEOPLE: NOT DIFFICULT AT ALL
SUM OF ALL RESPONSES TO PHQ QUESTIONS 1-9: 2
SUM OF ALL RESPONSES TO PHQ QUESTIONS 1-9: 2

## 2024-12-09 ENCOUNTER — ANCILLARY PROCEDURE (OUTPATIENT)
Dept: MAMMOGRAPHY | Facility: OTHER | Age: 39
End: 2024-12-09
Attending: NURSE PRACTITIONER
Payer: COMMERCIAL

## 2024-12-09 ENCOUNTER — TELEPHONE (OUTPATIENT)
Dept: MAMMOGRAPHY | Facility: HOSPITAL | Age: 39
End: 2024-12-09

## 2024-12-09 DIAGNOSIS — Z12.31 ENCOUNTER FOR SCREENING MAMMOGRAM FOR BREAST CANCER: ICD-10-CM

## 2024-12-09 PROCEDURE — 77063 BREAST TOMOSYNTHESIS BI: CPT | Mod: TC | Performed by: RADIOLOGY

## 2024-12-09 PROCEDURE — 77067 SCR MAMMO BI INCL CAD: CPT | Mod: TC | Performed by: RADIOLOGY

## 2025-02-11 DIAGNOSIS — F41.1 GAD (GENERALIZED ANXIETY DISORDER): ICD-10-CM

## 2025-02-11 RX ORDER — BUSPIRONE HYDROCHLORIDE 10 MG/1
10 TABLET ORAL 3 TIMES DAILY PRN
Qty: 60 TABLET | Refills: 3 | Status: SHIPPED | OUTPATIENT
Start: 2025-02-11

## 2025-03-09 ENCOUNTER — HEALTH MAINTENANCE LETTER (OUTPATIENT)
Age: 40
End: 2025-03-09

## 2025-04-16 DIAGNOSIS — F41.1 GAD (GENERALIZED ANXIETY DISORDER): ICD-10-CM

## 2025-04-16 RX ORDER — ESCITALOPRAM OXALATE 20 MG/1
20 TABLET ORAL DAILY
Qty: 90 TABLET | Refills: 1 | Status: SHIPPED | OUTPATIENT
Start: 2025-04-16

## 2025-04-16 NOTE — TELEPHONE ENCOUNTER
Lexapro       Last Written Prescription Date:  10/03/2024  Last Fill Quantity: 90,   # refills: 1  Last Office Visit: 11/14/2024  Future Office visit:

## 2025-08-05 ENCOUNTER — OFFICE VISIT (OUTPATIENT)
Dept: CHIROPRACTIC MEDICINE | Facility: OTHER | Age: 40
End: 2025-08-05
Attending: CHIROPRACTOR
Payer: COMMERCIAL

## 2025-08-05 DIAGNOSIS — M54.50 ACUTE BILATERAL LOW BACK PAIN WITHOUT SCIATICA: ICD-10-CM

## 2025-08-05 DIAGNOSIS — M99.03 SEGMENTAL AND SOMATIC DYSFUNCTION OF LUMBAR REGION: Primary | ICD-10-CM

## 2025-08-05 DIAGNOSIS — M99.02 SEGMENTAL AND SOMATIC DYSFUNCTION OF THORACIC REGION: ICD-10-CM

## 2025-08-05 DIAGNOSIS — M99.01 SEGMENTAL AND SOMATIC DYSFUNCTION OF CERVICAL REGION: ICD-10-CM
